# Patient Record
Sex: FEMALE | Race: WHITE
[De-identification: names, ages, dates, MRNs, and addresses within clinical notes are randomized per-mention and may not be internally consistent; named-entity substitution may affect disease eponyms.]

---

## 2020-11-02 ENCOUNTER — HOSPITAL ENCOUNTER (EMERGENCY)
Dept: HOSPITAL 92 - ERS | Age: 75
Discharge: HOME | End: 2020-11-02
Payer: MEDICARE

## 2020-11-02 DIAGNOSIS — E78.5: ICD-10-CM

## 2020-11-02 DIAGNOSIS — F32.9: ICD-10-CM

## 2020-11-02 DIAGNOSIS — E78.00: ICD-10-CM

## 2020-11-02 DIAGNOSIS — I10: ICD-10-CM

## 2020-11-02 DIAGNOSIS — K21.9: ICD-10-CM

## 2020-11-02 DIAGNOSIS — Z79.899: ICD-10-CM

## 2020-11-02 DIAGNOSIS — R10.13: Primary | ICD-10-CM

## 2020-11-02 LAB
ALBUMIN SERPL BCG-MCNC: 3.6 G/DL (ref 3.4–4.8)
ALP SERPL-CCNC: 88 U/L (ref 40–110)
ALT SERPL W P-5'-P-CCNC: 25 U/L (ref 8–55)
ANION GAP SERPL CALC-SCNC: 14 MMOL/L (ref 10–20)
AST SERPL-CCNC: 14 U/L (ref 5–34)
BASOPHILS # BLD AUTO: 0.1 THOU/UL (ref 0–0.2)
BASOPHILS NFR BLD AUTO: 0.9 % (ref 0–1)
BILIRUB SERPL-MCNC: 0.8 MG/DL (ref 0.2–1.2)
BUN SERPL-MCNC: 17 MG/DL (ref 9.8–20.1)
CALCIUM SERPL-MCNC: 9.7 MG/DL (ref 7.8–10.44)
CHLORIDE SERPL-SCNC: 102 MMOL/L (ref 98–107)
CO2 SERPL-SCNC: 29 MMOL/L (ref 23–31)
CREAT CL PREDICTED SERPL C-G-VRATE: 0 ML/MIN (ref 70–130)
EOSINOPHIL # BLD AUTO: 0 THOU/UL (ref 0–0.7)
EOSINOPHIL NFR BLD AUTO: 0.3 % (ref 0–10)
GLOBULIN SER CALC-MCNC: 2.7 G/DL (ref 2.4–3.5)
GLUCOSE SERPL-MCNC: 153 MG/DL (ref 83–110)
HGB BLD-MCNC: 11.9 G/DL (ref 12–16)
LIPASE SERPL-CCNC: 9 U/L (ref 8–78)
LYMPHOCYTES # BLD: 2.6 THOU/UL (ref 1.2–3.4)
LYMPHOCYTES NFR BLD AUTO: 30.4 % (ref 21–51)
MCH RBC QN AUTO: 30.6 PG (ref 27–31)
MCV RBC AUTO: 91.3 FL (ref 78–98)
MONOCYTES # BLD AUTO: 0.5 THOU/UL (ref 0.11–0.59)
MONOCYTES NFR BLD AUTO: 5.3 % (ref 0–10)
NEUTROPHILS # BLD AUTO: 5.5 THOU/UL (ref 1.4–6.5)
NEUTROPHILS NFR BLD AUTO: 63 % (ref 42–75)
PLATELET # BLD AUTO: 231 THOU/UL (ref 130–400)
POTASSIUM SERPL-SCNC: 3.9 MMOL/L (ref 3.5–5.1)
RBC # BLD AUTO: 3.88 MILL/UL (ref 4.2–5.4)
SODIUM SERPL-SCNC: 141 MMOL/L (ref 136–145)
WBC # BLD AUTO: 8.7 THOU/UL (ref 4.8–10.8)

## 2020-11-02 PROCEDURE — 93005 ELECTROCARDIOGRAM TRACING: CPT

## 2020-11-02 PROCEDURE — S0028 INJECTION, FAMOTIDINE, 20 MG: HCPCS

## 2020-11-02 PROCEDURE — 96375 TX/PRO/DX INJ NEW DRUG ADDON: CPT

## 2020-11-02 PROCEDURE — 74174 CTA ABD&PLVS W/CONTRAST: CPT

## 2020-11-02 PROCEDURE — 80053 COMPREHEN METABOLIC PANEL: CPT

## 2020-11-02 PROCEDURE — 85025 COMPLETE CBC W/AUTO DIFF WBC: CPT

## 2020-11-02 PROCEDURE — 96376 TX/PRO/DX INJ SAME DRUG ADON: CPT

## 2020-11-02 PROCEDURE — 96374 THER/PROPH/DIAG INJ IV PUSH: CPT

## 2020-11-02 PROCEDURE — 83690 ASSAY OF LIPASE: CPT

## 2020-11-02 PROCEDURE — 71275 CT ANGIOGRAPHY CHEST: CPT

## 2020-11-02 PROCEDURE — 84484 ASSAY OF TROPONIN QUANT: CPT

## 2020-11-02 NOTE — CT
CT ANGIO CHEST AND ABDOMEN PERFORMED WITH IV CONTRAST ENHANCEMENT WITH 3D RECONSTRUCTIONS:

 

Date:  11/02/2020

 

HISTORY:  

Chest pain. Shortness of breath. This exam was done per the aortic dissection protocol. 

 

FINDINGS:

There are reticular changes in the lung bases, mainly subpleural, probably related to scar versus ate
lectasis. There is no confluent infiltrative appearing process seen. 

 

There is no significant mediastinal, hilar, or axillary lymphadenopathy. The thoracic aorta is normal
 in caliber and there are no signs of dissection. 

 

CT ANGIO OF ABDOMEN PERFORMED WITH CONTRAST WITH 3D RECONSTRUCTIONS:

Small hiatal hernia is noted. The liver, spleen, pancreas, and gallbladder regions appear unremarkabl
e on this angiographic phase exam. 

 

Right and left adrenal glands, and right and left kidneys are normal in appearance. There is no signi
ficant periaortic or mesenteric lymphadenopathy. 

 

The thoracic aorta is normal in caliber. There is some mild atherosclerotic change. There is no disse
ction. There is atherosclerotic change at the takeoff of the celiac, as well as superior mesenteric a
rteries. There is suggestion of some moderate stenosis of the origin of the superior mesenteric arter
y. There is a patent VIOLA. There is moderate atherosclerotic change at the origin of the left renal ar
juarez. It is difficult to assess the degree of stenosis. Single renal arteries are present bilaterally
. 

 

IMPRESSION: 

1.  Small hiatal hernia. 

 

2.  No evidence of dissection or aneurysm. 

 

3.  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 

 

 

POS: [facility]

## 2020-11-04 ENCOUNTER — HOSPITAL ENCOUNTER (EMERGENCY)
Dept: HOSPITAL 92 - ERS | Age: 75
Discharge: HOME | End: 2020-11-04
Payer: MEDICARE

## 2020-11-04 DIAGNOSIS — E78.5: ICD-10-CM

## 2020-11-04 DIAGNOSIS — F32.9: ICD-10-CM

## 2020-11-04 DIAGNOSIS — E78.00: ICD-10-CM

## 2020-11-04 DIAGNOSIS — Z86.73: ICD-10-CM

## 2020-11-04 DIAGNOSIS — Z79.82: ICD-10-CM

## 2020-11-04 DIAGNOSIS — R10.11: Primary | ICD-10-CM

## 2020-11-04 DIAGNOSIS — K21.9: ICD-10-CM

## 2020-11-04 DIAGNOSIS — Z79.899: ICD-10-CM

## 2020-11-04 DIAGNOSIS — R10.816: ICD-10-CM

## 2020-11-04 DIAGNOSIS — I10: ICD-10-CM

## 2020-11-04 LAB
ALBUMIN SERPL BCG-MCNC: 3.5 G/DL (ref 3.4–4.8)
ALP SERPL-CCNC: 93 U/L (ref 40–110)
ALT SERPL W P-5'-P-CCNC: 32 U/L (ref 8–55)
ANION GAP SERPL CALC-SCNC: 12 MMOL/L (ref 10–20)
AST SERPL-CCNC: 20 U/L (ref 5–34)
BASOPHILS # BLD AUTO: 0 THOU/UL (ref 0–0.2)
BASOPHILS NFR BLD AUTO: 0.6 % (ref 0–1)
BILIRUB SERPL-MCNC: 0.7 MG/DL (ref 0.2–1.2)
BUN SERPL-MCNC: 15 MG/DL (ref 9.8–20.1)
CALCIUM SERPL-MCNC: 9.1 MG/DL (ref 7.8–10.44)
CHLORIDE SERPL-SCNC: 106 MMOL/L (ref 98–107)
CO2 SERPL-SCNC: 28 MMOL/L (ref 23–31)
CREAT CL PREDICTED SERPL C-G-VRATE: 0 ML/MIN (ref 70–130)
EOSINOPHIL # BLD AUTO: 0 THOU/UL (ref 0–0.7)
EOSINOPHIL NFR BLD AUTO: 0.6 % (ref 0–10)
GLOBULIN SER CALC-MCNC: 2.5 G/DL (ref 2.4–3.5)
GLUCOSE SERPL-MCNC: 131 MG/DL (ref 83–110)
GLUCOSE UR STRIP-MCNC: 100 MG/DL
HGB BLD-MCNC: 11.4 G/DL (ref 12–16)
LEUKOCYTE ESTERASE UR QL STRIP.AUTO: 25 LEU/UL
LIPASE SERPL-CCNC: 10 U/L (ref 8–78)
LYMPHOCYTES # BLD: 2 THOU/UL (ref 1.2–3.4)
LYMPHOCYTES NFR BLD AUTO: 31.2 % (ref 21–51)
MCH RBC QN AUTO: 31.8 PG (ref 27–31)
MCV RBC AUTO: 92.4 FL (ref 78–98)
MONOCYTES # BLD AUTO: 0.3 THOU/UL (ref 0.11–0.59)
MONOCYTES NFR BLD AUTO: 4.1 % (ref 0–10)
NEUTROPHILS # BLD AUTO: 4 THOU/UL (ref 1.4–6.5)
NEUTROPHILS NFR BLD AUTO: 63.5 % (ref 42–75)
PLATELET # BLD AUTO: 228 THOU/UL (ref 130–400)
POTASSIUM SERPL-SCNC: 3.7 MMOL/L (ref 3.5–5.1)
PROT UR STRIP.AUTO-MCNC: 30 MG/DL
RBC # BLD AUTO: 3.6 MILL/UL (ref 4.2–5.4)
RBC UR QL AUTO: (no result) HPF (ref 0–3)
SODIUM SERPL-SCNC: 142 MMOL/L (ref 136–145)
SP GR UR STRIP: 1.03 (ref 1–1.04)
WBC # BLD AUTO: 6.3 THOU/UL (ref 4.8–10.8)
WBC UR QL AUTO: (no result) HPF (ref 0–3)

## 2020-11-04 PROCEDURE — 81003 URINALYSIS AUTO W/O SCOPE: CPT

## 2020-11-04 PROCEDURE — 80053 COMPREHEN METABOLIC PANEL: CPT

## 2020-11-04 PROCEDURE — 84484 ASSAY OF TROPONIN QUANT: CPT

## 2020-11-04 PROCEDURE — 93005 ELECTROCARDIOGRAM TRACING: CPT

## 2020-11-04 PROCEDURE — 94760 N-INVAS EAR/PLS OXIMETRY 1: CPT

## 2020-11-04 PROCEDURE — 81015 MICROSCOPIC EXAM OF URINE: CPT

## 2020-11-04 PROCEDURE — 76705 ECHO EXAM OF ABDOMEN: CPT

## 2020-11-04 PROCEDURE — 83690 ASSAY OF LIPASE: CPT

## 2020-11-04 PROCEDURE — 85025 COMPLETE CBC W/AUTO DIFF WBC: CPT

## 2020-11-04 PROCEDURE — 96375 TX/PRO/DX INJ NEW DRUG ADDON: CPT

## 2020-11-04 PROCEDURE — 96374 THER/PROPH/DIAG INJ IV PUSH: CPT

## 2020-11-04 PROCEDURE — S0028 INJECTION, FAMOTIDINE, 20 MG: HCPCS

## 2020-11-04 NOTE — ULT
ULTRASOUND ABDOMEN LIMITED:

(RIGHT UPPER QUADRANT)

 

DATE:  11/04/2020

 

HISTORY:  

75-year-old female with right upper quadrant abdominal pain. 

 

FINDINGS: 

The gallbladder has normal wall thickness and has no evidence of gallstones or sludge.  The hepatic e
chogenicity is normal.  The right kidney has normal echogenicity and has no hydronephrosis.  The panc
reas is obscured by shadowing from bowel gas.  There is no biliary dilation.  The common duct caliber
 is 2 mm.

 

IMPRESSION: 

1.  No pathology identified.

2.  Pancreas not visualized.

 

 

jn []

 

POS: [facility]

## 2020-11-07 NOTE — EKG
Test Reason : 

Blood Pressure : ***/*** mmHG

Vent. Rate : 072 BPM     Atrial Rate : 072 BPM

   P-R Int : 112 ms          QRS Dur : 080 ms

    QT Int : 400 ms       P-R-T Axes : 038 020 011 degrees

   QTc Int : 438 ms

 

Normal sinus rhythm

Normal ECG

 

Confirmed by STERLING BAXTER DO ([id number]),  ABRIL SHEN ([id number]) on 11/7/2020 1:13:29 PM

 

Referred By:             Confirmed By:STERLING BAXTER DO